# Patient Record
Sex: FEMALE | Race: WHITE | Employment: OTHER | URBAN - METROPOLITAN AREA
[De-identification: names, ages, dates, MRNs, and addresses within clinical notes are randomized per-mention and may not be internally consistent; named-entity substitution may affect disease eponyms.]

---

## 2022-07-28 RX ORDER — LOSARTAN POTASSIUM 100 MG/1
100 TABLET ORAL EVERY MORNING
COMMUNITY

## 2022-07-28 RX ORDER — INDAPAMIDE 1.25 MG/1
1.25 TABLET, FILM COATED ORAL EVERY MORNING
COMMUNITY

## 2022-07-28 RX ORDER — AZELASTINE HCL 205.5 UG/1
1 SPRAY NASAL EVERY MORNING
COMMUNITY

## 2022-07-28 NOTE — PERIOP NOTES
[address] [facility] [address], [address] [facility]   MAIN OR                                  [phone]   MAIN PRE OP                          [phone]                                                                                AMBULATORY PRE OP          [phone]  PRE-ADMISSION TESTING    [phone]   Surgery Date:  Friday 8/5/22       Is surgery arrival time given by surgeon? NO  If NO, [address] staff will call you between 3 and 7pm the day before your surgery with your arrival time. (If your surgery is on a Monday, we will call you the Friday before.)    Call [phone] after 7pm Monday-Friday if you did not receive this call. INSTRUCTIONS BEFORE YOUR SURGERY   When You  Arrive Arrive at the [address] [facility] on the day of your surgery  Have your insurance card, photo ID, and any copayment (if needed)   Food   and   Drink NO food or drink after midnight the night before surgery    This means NO water, gum, mints, coffee, juice, etc.  No alcohol (beer, wine, liquor) 24 hours before and after surgery   Medications to   TAKE   Morning of Surgery MEDICATIONS TO TAKE THE MORNING OF SURGERY WITH A SIP OF WATER:   AZELASTINE IF NEEDED   Medications  To  STOP      7 days before surgery Non-Steroidal anti-inflammatory Drugs (NSAID's): for example, Ibuprofen (Advil, Motrin), Naproxen (Aleve)  Aspirin, if taking for pain   Herbal supplements, vitamins, and fish oil  Other:  (Pain medications not listed above, including Tylenol may be taken)   Blood  Thinners If you take  Aspirin, Plavix, Coumadin, or any blood-thinning or anti-blood clot medicine, talk to the doctor who prescribed the medications for pre-operative instructions.    Bathing Clothing  Jewelry  Valuables     If you shower the morning of surgery, please do not apply anything to your skin (lotions, powders, deodorant, or makeup, especially mascara)  Follow [employer] body wash instructions provided to you during PAT appointment. Begin 3 days prior to surgery. Do not shave or trim anywhere 24 hours before surgery  Wear your hair loose or down; no pony-tails, buns, or metal hair clips  Wear loose, comfortable, clean clothes  Wear glasses instead of contacts  Leave money, valuables, and jewelry, including body piercings, at home  If you were given an [employer], bring it on day of surgery. Going Home - or Spending the Night SAME-DAY SURGERY: You must have a responsible adult drive you home and stay with you 24 hours after surgery  ADMITS: If your doctor is keeping you in the hospital after surgery, leave personal belongings/luggage in your car until you have a hospital room number. Hospital discharge time is 12 noon  Drivers must be here before 12 noon unless you are told differently   Special Instructions NONE       Follow all instructions so your surgery wont be cancelled. Please, be on time. If a situation occurs and you are delayed the day of surgery, call [phone] or [phone]. If your physical condition changes (like a fever, cold, flu, etc.) call your surgeon. Home medication(s) reviewed and verified via   PHONE   during PAT appointment. The patient was contacted by  PHONE     The patient verbalizes understanding of all instructions and      DOES NOT   need reinforcement.  NONE

## 2022-08-04 ENCOUNTER — ANESTHESIA EVENT (OUTPATIENT)
Dept: SURGERY | Age: 77
End: 2022-08-04
Payer: MEDICARE

## 2022-08-04 RX ORDER — OXYCODONE HYDROCHLORIDE 5 MG/1
5 TABLET ORAL AS NEEDED
Status: CANCELLED | OUTPATIENT
Start: 2022-08-04

## 2022-08-04 RX ORDER — SODIUM CHLORIDE, SODIUM LACTATE, POTASSIUM CHLORIDE, CALCIUM CHLORIDE 600; 310; 30; 20 MG/100ML; MG/100ML; MG/100ML; MG/100ML
100 INJECTION, SOLUTION INTRAVENOUS CONTINUOUS
Status: CANCELLED | OUTPATIENT
Start: 2022-08-04

## 2022-08-04 RX ORDER — DIPHENHYDRAMINE HYDROCHLORIDE 50 MG/ML
12.5 INJECTION, SOLUTION INTRAMUSCULAR; INTRAVENOUS AS NEEDED
Status: CANCELLED | OUTPATIENT
Start: 2022-08-04 | End: 2022-08-04

## 2022-08-04 RX ORDER — SODIUM CHLORIDE 0.9 % (FLUSH) 0.9 %
5-40 SYRINGE (ML) INJECTION AS NEEDED
Status: CANCELLED | OUTPATIENT
Start: 2022-08-04

## 2022-08-04 RX ORDER — SODIUM CHLORIDE 0.9 % (FLUSH) 0.9 %
5-40 SYRINGE (ML) INJECTION EVERY 8 HOURS
Status: CANCELLED | OUTPATIENT
Start: 2022-08-04

## 2022-08-04 RX ORDER — HYDROMORPHONE HYDROCHLORIDE 1 MG/ML
.25-1 INJECTION, SOLUTION INTRAMUSCULAR; INTRAVENOUS; SUBCUTANEOUS
Status: CANCELLED | OUTPATIENT
Start: 2022-08-04

## 2022-08-04 RX ORDER — ONDANSETRON 2 MG/ML
4 INJECTION INTRAMUSCULAR; INTRAVENOUS AS NEEDED
Status: CANCELLED | OUTPATIENT
Start: 2022-08-04

## 2022-08-04 NOTE — H&P
Warren Phoenix, PARUL - Kiki Scott DPM - Griselda Solomons, DPM                                     Podiatric Surgery Pre-Operative History & Physical  Subjective:    Notes pain and deformity to right first metatarsal phalangeal joint. Pt has tried conservative measures such as orthotics, injections, NSAID, RICE which have failed. Pt at this time ready to undergo surgical correction for their condition. There are no interval updates to the patient's H&P since last saw PCP. History:  Hallux rigidus of right foot [M20.21]  Right foot pain [M79.671]  No Known Allergies  Family History   Problem Relation Age of Onset    Lung Disease Mother         COPD    Stroke Father     Hypertension Father     Heart Disease Father     Anesth Problems Neg Hx       Past Medical History:   Diagnosis Date    Arthritis     H/O motion sickness     Hypertension     Nausea & vomiting     Ptosis of both eyelids     Stroke ([facility] [address] 2013    POST OP BACK TUMOR REMOVAL    Unspecified adverse effect of anesthesia     HEADACHES     Past Surgical History:   Procedure Laterality Date    HX GI      colonoscopy    HX OTHER SURGICAL Bilateral 01/01/2013    BELPHEROPLASTY    HX OTHER SURGICAL  2013    BACK TUMOR REMOVAL NON MALIGNANT    HX TONSILLECTOMY       Social History     Tobacco Use    Smoking status: Never    Smokeless tobacco: Never   Substance Use Topics    Alcohol use: Yes     Alcohol/week: 0.8 standard drinks     Types: 1 Glasses of wine per week       Social History     Substance and Sexual Activity   Alcohol Use Yes    Alcohol/week: 0.8 standard drinks    Types: 1 Glasses of wine per week     Social History     Substance and Sexual Activity   Drug Use No      Social History     Tobacco Use   Smoking Status Never   Smokeless Tobacco Never     No current facility-administered medications for this encounter.      Current Outpatient Medications   Medication Sig    losartan (COZAAR) 100 mg tablet Take 100 mg by mouth Every morning. indapamide (LOZOL) 1.25 mg tablet Take 1.25 mg by mouth Every morning. OTHER Take 1 Tablet by mouth as needed. NASAL DECONGESTANT    azelastine (ASTEPRO) 205.5 mcg (0.15 %) 1 [place] by Both Nostrils route Every morning. Glucosamine &Chondroit-MV-Min3 131-142-58-0.5 mg tab Take 1 Tablet by mouth Every morning. HYOSCYAMINE SULFATE (LEVSIN PO) Take 1 Tab by mouth as needed for Diarrhea.    melatonin 1 mg tablet Take 1 mg by mouth nightly. multivitamin (ONE A DAY) tablet Take 1 Tablet by mouth Every morning. Objective:  Vitals: No data found. CV: regular rate / rhythm, +S1 / S2    Pulm: lungs clear to ascultation b/l, no wheezes/rales/rhonchi    Vascular:  DP 2 of 4, PT 1 of 4, capillary fill time brisk, edema is absent, skin temperature is WNL , varicosities are absent. Dermatological:  Nails are normal.  Skin is WNL in turgor and elasticity. Hyperkeratotic lesion at absent. Neurological:  DTR are present, protective sensation per 5.07 Beemer Rachele monofilament is present, patient is AAOx3, mood is normal.    Orthopedic:      B/L LE are symmetric, ROM of ankle, STJ, 1st MTPJ is WNL, MMT 5/5 for B/L LE. Pedal amputations: absent. Constitutional: Pt is a well developed elderly average male. Lymphatics: no tenderness to palpation of neck/axillary/inguinal nodes. Imaging    Right foot xray   Bony spurring arround right first metatarsal phalangeal joint. Labs:  No results for input(s): HGB, HCT, INR, NA, K, CL, CO2, BUN, CREA, GLU, HGBEXT, HCTEXT, INREXT in the last 72 hours.     Assessment/Plan:       78year old female had a PMH signification for HTN presents for surgical correction of right foot hallux limitus via cheilectomy     - Patient is evaluated and treated  - Patient had exhausted all conservative measures  - Patient is cleared for medical clearance. - Patient would like to proceed with surgical correction of deformity All alternatives, risks, benefits, and complications of proposed procedure(s) discussed at length with the patient. All questions/concerns answered/addressed.

## 2022-08-05 ENCOUNTER — ANESTHESIA (OUTPATIENT)
Dept: SURGERY | Age: 77
End: 2022-08-05
Payer: MEDICARE

## 2022-08-05 ENCOUNTER — APPOINTMENT (OUTPATIENT)
Dept: GENERAL RADIOLOGY | Age: 77
End: 2022-08-05
Attending: PODIATRIST
Payer: MEDICARE

## 2022-08-05 ENCOUNTER — HOSPITAL ENCOUNTER (OUTPATIENT)
Age: 77
Setting detail: OUTPATIENT SURGERY
Discharge: HOME | End: 2022-08-05
Attending: PODIATRIST | Admitting: PODIATRIST
Payer: MEDICARE

## 2022-08-05 VITALS
TEMPERATURE: 97.3 F | OXYGEN SATURATION: 96 % | HEIGHT: 68 IN | DIASTOLIC BLOOD PRESSURE: 70 MMHG | SYSTOLIC BLOOD PRESSURE: 149 MMHG | WEIGHT: 165 LBS | RESPIRATION RATE: 16 BRPM | BODY MASS INDEX: 25.01 KG/M2 | HEART RATE: 65 BPM

## 2022-08-05 PROCEDURE — 76210000026 HC REC RM PH II 1 TO 1.5 HR: Performed by: PODIATRIST

## 2022-08-05 PROCEDURE — 74011250636 HC RX REV CODE- 250/636: Performed by: PODIATRIST

## 2022-08-05 PROCEDURE — 77030014125 HC TY EPDRL BBMI -B: Performed by: ANESTHESIOLOGY

## 2022-08-05 PROCEDURE — 73630 X-RAY EXAM OF FOOT: CPT

## 2022-08-05 PROCEDURE — 77030000032 HC CUF TRNQT ZIMM -B: Performed by: PODIATRIST

## 2022-08-05 PROCEDURE — 74011000250 HC RX REV CODE- 250: Performed by: PODIATRIST

## 2022-08-05 PROCEDURE — 77030002933 HC SUT MCRYL J&J -A: Performed by: PODIATRIST

## 2022-08-05 PROCEDURE — 76010000149 HC OR TIME 1 TO 1.5 HR: Performed by: PODIATRIST

## 2022-08-05 PROCEDURE — 76210000063 HC OR PH I REC FIRST 0.5 HR: Performed by: PODIATRIST

## 2022-08-05 PROCEDURE — 74011000250 HC RX REV CODE- 250: Performed by: NURSE ANESTHETIST, CERTIFIED REGISTERED

## 2022-08-05 PROCEDURE — 88304 TISSUE EXAM BY PATHOLOGIST: CPT

## 2022-08-05 PROCEDURE — 76060000033 HC ANESTHESIA 1 TO 1.5 HR: Performed by: PODIATRIST

## 2022-08-05 PROCEDURE — 2709999900 HC NON-CHARGEABLE SUPPLY: Performed by: PODIATRIST

## 2022-08-05 PROCEDURE — 77030002916 HC SUT ETHLN J&J -A: Performed by: PODIATRIST

## 2022-08-05 PROCEDURE — 74011250636 HC RX REV CODE- 250/636: Performed by: ANESTHESIOLOGY

## 2022-08-05 PROCEDURE — 77030040361 HC SLV COMPR DVT MDII -B

## 2022-08-05 PROCEDURE — 77030040922 HC BLNKT HYPOTHRM STRY -A

## 2022-08-05 PROCEDURE — 77030006773 HC BLD SAW OSC BRSM -A: Performed by: PODIATRIST

## 2022-08-05 PROCEDURE — 74011250636 HC RX REV CODE- 250/636: Performed by: NURSE ANESTHETIST, CERTIFIED REGISTERED

## 2022-08-05 RX ORDER — FAMOTIDINE 10 MG/ML
INJECTION INTRAVENOUS AS NEEDED
Status: DISCONTINUED | OUTPATIENT
Start: 2022-08-05 | End: 2022-08-05 | Stop reason: HOSPADM

## 2022-08-05 RX ORDER — HYDROCODONE BITARTRATE AND ACETAMINOPHEN 5; 325 MG/1; MG/1
1 TABLET ORAL
Status: CANCELLED | OUTPATIENT
Start: 2022-08-05

## 2022-08-05 RX ORDER — LIDOCAINE HYDROCHLORIDE 10 MG/ML
0.1 INJECTION, SOLUTION EPIDURAL; INFILTRATION; INTRACAUDAL; PERINEURAL AS NEEDED
Status: DISCONTINUED | OUTPATIENT
Start: 2022-08-05 | End: 2022-08-05 | Stop reason: HOSPADM

## 2022-08-05 RX ORDER — SODIUM CHLORIDE 0.9 % (FLUSH) 0.9 %
5-40 SYRINGE (ML) INJECTION AS NEEDED
Status: DISCONTINUED | OUTPATIENT
Start: 2022-08-05 | End: 2022-08-05 | Stop reason: HOSPADM

## 2022-08-05 RX ORDER — SODIUM CHLORIDE 0.9 % (FLUSH) 0.9 %
5-40 SYRINGE (ML) INJECTION AS NEEDED
Status: CANCELLED | OUTPATIENT
Start: 2022-08-05

## 2022-08-05 RX ORDER — DEXAMETHASONE SODIUM PHOSPHATE 4 MG/ML
INJECTION, SOLUTION INTRA-ARTICULAR; INTRALESIONAL; INTRAMUSCULAR; INTRAVENOUS; SOFT TISSUE AS NEEDED
Status: DISCONTINUED | OUTPATIENT
Start: 2022-08-05 | End: 2022-08-05 | Stop reason: HOSPADM

## 2022-08-05 RX ORDER — LIDOCAINE HYDROCHLORIDE 20 MG/ML
INJECTION, SOLUTION INFILTRATION; PERINEURAL AS NEEDED
Status: DISCONTINUED | OUTPATIENT
Start: 2022-08-05 | End: 2022-08-05 | Stop reason: HOSPADM

## 2022-08-05 RX ORDER — HYDROMORPHONE HYDROCHLORIDE 1 MG/ML
1 INJECTION, SOLUTION INTRAMUSCULAR; INTRAVENOUS; SUBCUTANEOUS
Status: CANCELLED | OUTPATIENT
Start: 2022-08-05

## 2022-08-05 RX ORDER — EPHEDRINE SULFATE/0.9% NACL/PF 50 MG/5 ML
SYRINGE (ML) INTRAVENOUS AS NEEDED
Status: DISCONTINUED | OUTPATIENT
Start: 2022-08-05 | End: 2022-08-05 | Stop reason: HOSPADM

## 2022-08-05 RX ORDER — ONDANSETRON 2 MG/ML
INJECTION INTRAMUSCULAR; INTRAVENOUS AS NEEDED
Status: DISCONTINUED | OUTPATIENT
Start: 2022-08-05 | End: 2022-08-05 | Stop reason: HOSPADM

## 2022-08-05 RX ORDER — ACETAMINOPHEN 325 MG/1
650 TABLET ORAL
Status: CANCELLED | OUTPATIENT
Start: 2022-08-05

## 2022-08-05 RX ORDER — SODIUM CHLORIDE 0.9 % (FLUSH) 0.9 %
5-40 SYRINGE (ML) INJECTION EVERY 8 HOURS
Status: CANCELLED | OUTPATIENT
Start: 2022-08-05

## 2022-08-05 RX ORDER — NALOXONE HYDROCHLORIDE 0.4 MG/ML
0.2 INJECTION, SOLUTION INTRAMUSCULAR; INTRAVENOUS; SUBCUTANEOUS
Status: DISCONTINUED | OUTPATIENT
Start: 2022-08-05 | End: 2022-08-05 | Stop reason: HOSPADM

## 2022-08-05 RX ORDER — MIDAZOLAM HYDROCHLORIDE 1 MG/ML
INJECTION, SOLUTION INTRAMUSCULAR; INTRAVENOUS AS NEEDED
Status: DISCONTINUED | OUTPATIENT
Start: 2022-08-05 | End: 2022-08-05 | Stop reason: HOSPADM

## 2022-08-05 RX ORDER — FLUMAZENIL 0.1 MG/ML
0.2 INJECTION INTRAVENOUS
Status: DISCONTINUED | OUTPATIENT
Start: 2022-08-05 | End: 2022-08-05 | Stop reason: HOSPADM

## 2022-08-05 RX ORDER — SODIUM CHLORIDE 0.9 % (FLUSH) 0.9 %
5-40 SYRINGE (ML) INJECTION EVERY 8 HOURS
Status: DISCONTINUED | OUTPATIENT
Start: 2022-08-05 | End: 2022-08-05 | Stop reason: HOSPADM

## 2022-08-05 RX ORDER — FENTANYL CITRATE 50 UG/ML
INJECTION, SOLUTION INTRAMUSCULAR; INTRAVENOUS AS NEEDED
Status: DISCONTINUED | OUTPATIENT
Start: 2022-08-05 | End: 2022-08-05 | Stop reason: HOSPADM

## 2022-08-05 RX ORDER — PROPOFOL 10 MG/ML
INJECTION, EMULSION INTRAVENOUS
Status: DISCONTINUED | OUTPATIENT
Start: 2022-08-05 | End: 2022-08-05 | Stop reason: HOSPADM

## 2022-08-05 RX ORDER — SODIUM CHLORIDE, SODIUM LACTATE, POTASSIUM CHLORIDE, CALCIUM CHLORIDE 600; 310; 30; 20 MG/100ML; MG/100ML; MG/100ML; MG/100ML
125 INJECTION, SOLUTION INTRAVENOUS CONTINUOUS
Status: DISCONTINUED | OUTPATIENT
Start: 2022-08-05 | End: 2022-08-05 | Stop reason: HOSPADM

## 2022-08-05 RX ORDER — PROPOFOL 10 MG/ML
INJECTION, EMULSION INTRAVENOUS AS NEEDED
Status: DISCONTINUED | OUTPATIENT
Start: 2022-08-05 | End: 2022-08-05 | Stop reason: HOSPADM

## 2022-08-05 RX ADMIN — ONDANSETRON HYDROCHLORIDE 4 MG: 2 SOLUTION INTRAMUSCULAR; INTRAVENOUS at 07:57

## 2022-08-05 RX ADMIN — Medication 700 MCG: at 07:52

## 2022-08-05 RX ADMIN — FAMOTIDINE 20 MG: 10 INJECTION INTRAVENOUS at 07:57

## 2022-08-05 RX ADMIN — DEXAMETHASONE SODIUM PHOSPHATE 8 MG: 4 INJECTION, SOLUTION INTRAMUSCULAR; INTRAVENOUS at 07:57

## 2022-08-05 RX ADMIN — SODIUM CHLORIDE, POTASSIUM CHLORIDE, SODIUM LACTATE AND CALCIUM CHLORIDE 125 ML/HR: 600; 310; 30; 20 INJECTION, SOLUTION INTRAVENOUS at 06:37

## 2022-08-05 RX ADMIN — LIDOCAINE HYDROCHLORIDE 100 MG: 20 INJECTION, SOLUTION INFILTRATION; PERINEURAL at 07:35

## 2022-08-05 RX ADMIN — MIDAZOLAM HYDROCHLORIDE 2 MG: 2 INJECTION, SOLUTION INTRAMUSCULAR; INTRAVENOUS at 07:32

## 2022-08-05 RX ADMIN — PROPOFOL 75 MCG/KG/MIN: 10 INJECTION, EMULSION INTRAVENOUS at 07:35

## 2022-08-05 RX ADMIN — CEFAZOLIN SODIUM 2 G: 1 POWDER, FOR SOLUTION INTRAMUSCULAR; INTRAVENOUS at 07:44

## 2022-08-05 RX ADMIN — PROPOFOL 50 MG: 10 INJECTION, EMULSION INTRAVENOUS at 07:35

## 2022-08-05 RX ADMIN — FENTANYL CITRATE 50 MCG: 50 INJECTION, SOLUTION INTRAMUSCULAR; INTRAVENOUS at 07:33

## 2022-08-05 NOTE — ANESTHESIA POSTPROCEDURE EVALUATION
Procedure(s):  RIGHT FOOT CHEILECTOMY FOR FIRST METATARSAL PHALANGEAL JOINT (MAC WITH LOCAL). MAC    Anesthesia Post Evaluation        Patient location during evaluation: bedside  Level of consciousness: awake  Pain management: satisfactory to patient  Airway patency: patent  Anesthetic complications: no  Cardiovascular status: acceptable  Respiratory status: acceptable  Hydration status: acceptable        INITIAL Post-op Vital signs:   Vitals Value Taken Time   /75 08/05/22 0935   Temp 36.3 °C (97.3 °F) 08/05/22 0935   Pulse 64 08/05/22 0939   Resp 15 08/05/22 0939   SpO2 98 % 08/05/22 0939   Vitals shown include unvalidated device data.

## 2022-08-05 NOTE — OP NOTES
Operative Report     Procedure Date: 8/5/2022     Surgeon: Carina Cano DPM    1st Assistant: Keshav Cazares DPM     Pre-Op diagnosis:   right foot hallux limitus    Right foot ganglion cyst     Post-Op diagnosis:   right foot hallux limitus    Right foot ganglion cyst      Procedure:   Right foot chilectomy  2. Right foot ganglion cyst excision     Pathology:   Right foot soft tissue mass      Anesthesia: MAC with local (30 cc 1: 1 mix 1% lidocaine and 0.5% marcaine)     Hemostasis: Ankle tourniquet at 250mmHg for 44min     Estimated Blood loss: 20 cc     Materials:   3-0 monocryl   4-0 nylon     Injectables: 2cc mixture ( 1cc dexamethasone phosphate and 1cc of 0.5% marcaine)     Complications: none     Description of procedure:  Pre-operative patient identification was carried out by myself, then the patient was brought to the operating room and placed on the operating table in a supine position. After adequate anesthesia was obtained the right LE was then prepped and draped in the normal sterile fashion. Attention was directed to the dorsal aspect of the 1st metatarsal head of the right foot where a 4cm linear longitudinal incision was made medial & parallel to the tendon of the extensor hallucis longus tendon & involved the contour of the deformity. The incision was opened through the subcutaneous tissues using sharp & blunt dissection. Care was taken to identify & retract all vital neural & vascular structures. All bleeders were ligated & cauterized as necessary. The soft tissue mass was the exposed and was carefully free medially & laterally thus exposing it entirity at the operative site. Some gelatenous fluid was released from the soft tissue mass as it was excised. The mass was then slowly excised from the capsule via blunt dissection. The mass was excised in entirety with no nerve or tendon or bone attached exposed.     The periosteal & capsular structures were then carefully freed of their osseous attachments & reflected medially & laterally thus exposing the head of the 1st metatarsal at the operative site. Next, utilizing a sagittal bone saw, the dorsal & medial prominences were resected & passed from the operative field. All rough edges were then smoothed with a bone rasp & sagittal bone saw. The wound was then flushed with copious amounts of sterile normal saline. The periosteal & capsular structures were reapproximated & coapted utilizing 3-0 Monocryl. Redundant capsular tissue was resected as necessary. The subcuticular tissues were then reapproximated & coapted utilizing 3-0 monocryl and and the skin was reapproximated & coapted utilizing 4-0 nylon in simple interrupted suture technique and dressed w/steri strips and mastisol. The patient tolerated the procedure and anesthesia well, and was transported from the operating room to the PACU with vital signs stable and with the neurovascular status of the operative foot intact.

## 2022-08-05 NOTE — BRIEF OP NOTE
Brief Postoperative Note    Patient: Chelsie Moreno  YOB: 1945  MRN: [id number]    Date of Procedure: 8/5/2022     Pre-Op Diagnosis: Hallux rigidus of right foot [M20.21]  Right foot pain [M79.671]    Post-Op Diagnosis: Same as preoperative diagnosis. Procedure(s):  RIGHT FOOT CHEILECTOMY FOR FIRST METATARSAL PHALANGEAL JOINT (MAC WITH LOCAL)    Surgeon(s):  Vinod Bond DPM    Surgical Assistant: Surg Asst-1: Felicia Pepe    Anesthesia: MAC with loca ( 30 cc 1:1 mix 1% lidocaine plain and 0.5% marcaine plain    Estimated Blood Loss (mL): less than 50     Complications: None    Specimens:   ID Type Source Tests Collected by Time Destination   1 : Right foot ganglion cyst Preservative Foot, Right  Vinod Bond DPM 8/5/2022 0801 Pathology        Implants: * No implants in log *    Drains: * No LDAs found *    Findings: Patient tolerated procedure and anesthesia well.      Electronically Signed by Miranda Rios DPM on 8/5/2022 at 8:43 AM

## 2022-08-05 NOTE — PROGRESS NOTES
Pacu Discharge Note    Patient stable. VSS. Pain is controlled. Per Anesthesia okay for patient to be discharged. Discussed discharge instructions with Shahid. Instructed to call surgeon with any concerns or questions. Discharge written instructions given in packet. Answered all questions. No driving until surgeon gives okay after follow up appointment. IV removed prior to discharge. Patient and belongings brought down with patient for discharge home in private vehicle. Patient exits my care.

## 2022-08-05 NOTE — ANESTHESIA PREPROCEDURE EVALUATION
Anesthetic History   No history of anesthetic complications  PONV          Review of Systems / Medical History  Patient summary reviewed, nursing notes reviewed and pertinent labs reviewed    Pulmonary  Within defined limits                 Neuro/Psych   Within defined limits           Cardiovascular    Hypertension                   GI/Hepatic/Renal  Within defined limits              Endo/Other  Within defined limits      Arthritis     Other Findings              Physical Exam    Airway  Mallampati: II  TM Distance: > 6 cm  Neck ROM: normal range of motion   Mouth opening: Normal     Cardiovascular  Regular rate and rhythm,  S1 and S2 normal,  no murmur, click, rub, or gallop  Rhythm: regular  Rate: normal         Dental  No notable dental hx       Pulmonary  Breath sounds clear to auscultation               Abdominal  GI exam deferred       Other Findings            Anesthetic Plan    ASA: 2  Anesthesia type: MAC          Induction: Intravenous  Anesthetic plan and risks discussed with: Patient

## 2022-08-05 NOTE — DISCHARGE INSTRUCTIONS
Vangie Arthur DPM - Vadimgilberto Scott DPM - Gisel Mcdaniels DPM         Podiatric Surgery Post-Operative Instructions    Foot surgery is more inconvenient than painful. Although it is normal to have some pain after surgery, you may be surprised at how well you actually feel. In spite of this, it is imperative that you follow the instructions carefully and prepare yourself and your family for an extended period of rest and convalescence. Numbness in the feet will wear off after approximately five to twelve hours (this will vary patient to patient). Start taking your pain medication immediately upon returning home. First take the anti-inflammatory medicine with food or milk. Take the anti-inflammatory even if you experience no pain. The painkiller is only for pain not relieved by the anti-inflammatory and should be taken only if needed. Stop the medication if you develop any abnormal rash, stomach pains, or unusual sensation and call the office. The following medications have been prescribed to you:    Rx pain killer: Hydrocodone Acetominohen 5/325 one tab by mouth every 4 hrs as needed for pain    Rx antibiotic: Keflex 500 mg one tab by mouth twice daily for 10 days    Rx NSAID:  Motrin 600mg by mouth three times a day     Black and blue toes or black and blue in areas un-bandaged are a normal occurrence and resolves unremarkably after surgery. Use a shower protector prescribed by your doctor (can obtain from [facility], drug [employer]), doubled up plastic trash bags with rubber bands, or you may sponge bathe. The bandage must be kept completely dry after surgery. Notify the doctor if your bandage becomes wet. YOU MUST NOT REMOVE YOUR DRESSING UNLESS INSTRUCTED OTHERWISE. Your feet should be elevated above your hip at all times. DO not sit with the foot on a hassock or chair.   When traveling, sit in the back seat with the foot elevated on pillows. You should sleep with the surgical shoe on for the first three weeks post-operatively or until notified by your doctor that it is safe to be removed while sleeping. Ice may be indicated for your surgery, you should put a cold pack behind the knee of the operative leg, 20 minutes on, 20 minutes off, especially in the first 1-2 days post op. This will reduce pain and swelling and aid in a faster recovery. Instructions for Walking After Foot Surgery    Weightbearing status: right heel weight bearing  Most patients can walk with only the surgical shoe after foot surgery (canes and walkers may be needed for additional stability) this will be determined by the doctor or nurses post-operatively. The following are important rules regarding walking after foot surgery for the first one to three weeks: You may walk or stand for bathroom or meals only. No prolonged standing or walking. No public places. Extremely limited use of stairs. Try to stay on one level of your home as long as possible during the day. General rule; 50 minutes off foot with limb elevated, 10 minutes on foot (maximum) for meals and bathroom only. When climbing stairs carefully advance one foot at a time (flatfooted) with assistance of a person and the banister or on your buttocks. Walking With the Surgical Shoe    The shoe should be snuggly applied as to avoid movement or shifting while attempting to stand. Please sleep with the shoe on as stated unless otherwise directed by your doctor. While walking, the right foot should be turned out at approximately 2 oclock. Left foot should be turned outward at approximately 10 oclock. Walk with the surgical foot rotated outward as shown. DO not pull toes up in the air by walking on your heel. Keep your foot flat and literally drag it along slightly off the ground surface.   Do not push off or bend the front of your foot while walking (no propulsion). Wear a shoe or sneaker of comparable heel height on the non-surgical foot. SIMPLY WALK ON THE FLAT OF YOUR FOOT WITH THE WEIGHT SHIFTED BACKWARDS TOWARDS THE HEEL AND THE BIG TOE TURNED OUTWARD. If you have additional questions or need assistance please call our office for emergency service 24 hours a day. Do not initiate any action or remove the bandages unless directed by your doctor. If you notice any excessive bleeding or have pain not relieved by the prescribed medications, please call the emergency number without hesitation. DISCHARGE SUMMARY from your Nurse      PATIENT INSTRUCTIONS    After general anesthesia or intravenous sedation, for 24 hours or while taking prescription Narcotics:  Limit your activities  Do not drive and operate hazardous machinery  Do not make important personal or business decisions  Do  not drink alcoholic beverages  If you have not urinated within 8 hours after discharge, please contact your surgeon on call. Report the following to your surgeon:  Excessive pain, swelling, redness or odor of or around the surgical area  Temperature over 100.5  Nausea and vomiting lasting longer than 4 hours or if unable to take medications  Any signs of decreased circulation or nerve impairment to extremity: change in color, persistent  numbness, tingling, coldness or increase pain  Any questions      GOOD HELP TO FIGHT AN INFECTION  Here are a few tip to help reduce the chance of getting an infection after surgery:  Wash Your Hands  Good handwashing is the most important thing you and your caregiver can do. Wash before and after caring for any wounds. Dry your hand with a clean towel. Wash with soap and water for at least 20 seconds. A TIP: sing the \"Happy Birthday\" song through one time while washing to help with the timing. Use a hand  in between washings.     Shower  When your surgeon says it is OK to take a shower, use a new bar of antibacterial soap (if that is what you use, and keep that bar of soap ONLY for your use), or antibacterial body wash. Use a clean wash cloth or sponge when you bathe. Dry off with a clean towel  after every bath - be careful around any wounds, skin staples, sutures or surgical glue over/on wounds. Do not enter swimming pools, hot tubs, lakes, rivers and/or ocean until wounds are healed and your doctor/surgeon says it is OK. Use Clean Sheets  Sleep on freshly laundered sheets after your surgery. Keep the surgery site covered with a clean, dry bandage (if instructed to do so). If the bandage becomes soiled, reapply a new, dry, clean bandage. Do not allow pets to sleep with you while your wound is healing. Lifestyle Modification and Controlling Your Blood Sugar  Smoking slows wound healing. Stop smoking and limit exposure to second-hand smoke. High blood sugar slows wound healing. Eat a well-balanced diet to provide proper nutrition while healing  Monitor your blood sugar (if you are a diabetic) and take your medications as you are suppose to so you can control you blood sugar after surgery. COUGH AND DEEP BREATHE    Breathing deeply and coughing are very important exercises to do after surgery. Deep breathing and coughing open the little air tubes and air sacks in your lungs. You take deep breaths every day. You may not even notice - it is just something you do when you sigh or yawn. It is a natural exercise you do to keep these air passages open. After surgery, take deep breaths and cough, on purpose. DIRECTIONS:  Take 10 to 15 slow deep breaths every hour while awake. Breathe in deeply, and hold it for 2 seconds. Exhale slowly through puckered lips, like blowing up a balloon. After every 4th or 5th deep breath, hug your pillow to your chest or belly and give a hard, deep cough. Yes, it will probably hurt. But doing this exercise is a very important part of healing after surgery.   Take your pain medicine to help you do this exercise without too much pain. Coughing and deep breathing help prevent bronchitis and pneumonia after surgery. If you had chest or belly surgery, use a pillow as a \"hug gerda\" and hold it tightly to your chest or belly when you cough. ANKLE PUMPS    Ankle pumps increase the circulation of oxygenated blood to your lower extremities and decrease your risk for circulation problems such as blood clots. They also stretch the muscles, tendons and ligaments in your foot and ankle, and prevent joint contracture in the ankle and foot, especially after surgeries on the legs. It is important to do ankle pump exercises regularly after surgery because immobility increases your risk for developing a blood clot. Your doctor may also have you take an Aspirin for the next few days as well. If your doctor did not ask you to take an Aspirin, consult with him before starting Aspirin therapy on your own. The exercise is quite simple. Slowly point your foot forward, feeling the muscles on the top of your lower leg stretch, and hold this position for 5 seconds. Next, pull your foot back toward you as far as possible, stretching the calf muscles, and hold that position for 5 seconds. Repeat with the other foot. Perform 10 repetitions every hour while awake for both ankles if possible (down and then up with the foot once is one repetition). You should feel gentle stretching of the muscles in your lower leg when doing this exercise. If you feel pain, or your range of motion is limited, don't push too hard. Only go the limit your joint and muscles will let you go. If you have increasing pain, progressively worsening leg warmth or swelling, STOP the exercise and call your doctor. These are general instructions for a healthy lifestyle:    *   Please give a list of your current medications to your Primary Care Provider.   * Please update this list whenever your medications are discontinued, doses are changed, or new medications (including over-the-counter products) are added. *   Please carry medication information at all times in case of emergency situations. About Smoking  No smoking / No tobacco products  Avoid exposure to second hand smoke     Surgeon General's Warning:  Quitting smoking now greatly reduces serious risk to your health. Obesity, smoking, and sedentary lifestyle greatly increases your risk for illness and disease. A healthy diet, regular physical exercise & weight monitoring are important for maintaining a healthy lifestyle. Congestive Heart Failure  You may be retaining fluid if you have a history of heart failure or if you experience any of the following symptoms:  Weight gain of 3 pounds or more overnight or 5 pounds in a week, increased swelling in your hands or feet or shortness of breath while lying flat in bed. Please call your doctor as soon as you notice any of these symptoms; do not wait until your next office visit. Recognize signs and symptoms of STROKE:  F -  Face looks uneven  A -  Arms unable to move or move evenly  S -  Speech slurred or non-existent  T -  Time-call 911 as soon as signs and symptoms begin-DO NOT go          back to bed or wait to see if you get better-TIME IS BRAIN. Warning Signs of HEART ATTACK   Call 911 if you have these symptoms:    Chest discomfort. Most heart attacks involve discomfort in the center of the chest that lasts more than a few minutes, or that goes away and comes back. It can feel like uncomfortable pressure, squeezing, fullness, or pain. Discomfort in other areas of the upper body. Symptoms can include pain or discomfort in one or both arms, the back, neck, jaw, or stomach. Shortness of breath with or without chest discomfort. Other signs may include breaking out in a cold sweat, nausea, or lightheadedness.     Don't wait more than five minutes to call 911 - MINUTES MATTER! Fast action can save your life. Calling 911 is almost always the fastest way to get lifesaving treatment. Emergency Medical Services staff can begin treatment when they arrive -- up to an hour sooner than if someone gets to the hospital by car. CONTENTS FOUND IN YOUR DISCHARGE ENVELOPE:  [x]     Surgeon and Hospital Discharge Instructions  [x]     [facility] Care Provider Card        The following personal items collected during your admission are returned to you:   Dental Appliance: Dental Appliances: None  Vision: Visual Aid: Glasses  Hearing Aid:    Jewelry: Jewelry: None  Clothing: Clothing: Other (comment) (Clothes in PACU locker)  Other Valuables:  Other Valuables: Eyeglasses (Glasses at PACU nurse station)  Valuables sent to safe: [x]     Surgeon and Hospital Discharge Instructions  [x]     [facility] Provider Card

## (undated) DEVICE — SOLUTION IRRIG 1000ML 0.9% SOD CHL USP POUR PLAS BTL

## (undated) DEVICE — BANDAGE,GAUZE,BULKEE II,4.5"X4.1YD,STRL: Brand: MEDLINE

## (undated) DEVICE — HYPODERMIC SAFETY NEEDLE: Brand: MONOJECT

## (undated) DEVICE — EXTREMITY-SFMCASU: Brand: MEDLINE INDUSTRIES, INC.

## (undated) DEVICE — GLOVE SURG SZ 8 L12IN FNGR THK79MIL GRN LTX FREE

## (undated) DEVICE — TUBING SUCT 10FR MAL ALUM SHFT FN CAP VENT UNIV CONN W/ OBT

## (undated) DEVICE — BNDG ELAS HK LOOP 4X5YD NS -- MATRIX

## (undated) DEVICE — BLADE SAW OSC COARSE 25X9MM --

## (undated) DEVICE — ZIMMER® STERILE DISPOSABLE TOURNIQUET CUFF WITH PROTECTIVE SLEEVE AND PLC, DUAL PORT, SINGLE BLADDER, 18 IN. (46 CM)

## (undated) DEVICE — GLOVE SURG SZ 65 THK91MIL LTX FREE SYN POLYISOPRENE

## (undated) DEVICE — DRESSING,GAUZE,XEROFORM,CURAD,1"X8",ST: Brand: CURAD

## (undated) DEVICE — PACK,BASIC,SIRUS,V: Brand: MEDLINE

## (undated) DEVICE — TAPE,CLOTH/SILK,CURAD,3"X10YD,LF,40/CS: Brand: CURAD

## (undated) DEVICE — PAD,ABDOMINAL,5"X9",ST,LF,25/BX: Brand: MEDLINE INDUSTRIES, INC.

## (undated) DEVICE — SUTURE MCRYL SZ 3-0 L27IN ABSRB UD L26MM SH 1/2 CIR Y416H

## (undated) DEVICE — SPONGE GZ W4XL4IN COT 12 PLY TYP VII WVN C FLD DSGN

## (undated) DEVICE — Device: Brand: JELCO

## (undated) DEVICE — SUTURE ETHLN SZ 4-0 L18IN NONABSORBABLE BLK L19MM PS-2 3/8 1667H

## (undated) DEVICE — GLOVE SURG SZ 6 L12IN FNGR THK79MIL GRN LTX FREE